# Patient Record
Sex: FEMALE | Race: WHITE | NOT HISPANIC OR LATINO | Employment: UNEMPLOYED | URBAN - METROPOLITAN AREA
[De-identification: names, ages, dates, MRNs, and addresses within clinical notes are randomized per-mention and may not be internally consistent; named-entity substitution may affect disease eponyms.]

---

## 2017-02-23 ENCOUNTER — HOSPITAL ENCOUNTER (EMERGENCY)
Facility: CLINIC | Age: 57
Discharge: HOME | End: 2017-02-23
Attending: EMERGENCY MEDICINE | Admitting: EMERGENCY MEDICINE
Payer: MEDICARE

## 2017-02-23 ENCOUNTER — APPOINTMENT (OUTPATIENT)
Dept: CT IMAGING | Facility: CLINIC | Age: 57
End: 2017-02-23
Attending: EMERGENCY MEDICINE
Payer: MEDICARE

## 2017-02-23 VITALS
SYSTOLIC BLOOD PRESSURE: 158 MMHG | WEIGHT: 151 LBS | DIASTOLIC BLOOD PRESSURE: 103 MMHG | TEMPERATURE: 98 F | HEART RATE: 99 BPM | BODY MASS INDEX: 23.7 KG/M2 | HEIGHT: 67 IN | RESPIRATION RATE: 16 BRPM | OXYGEN SATURATION: 99 %

## 2017-02-23 DIAGNOSIS — R10.32 ABDOMINAL PAIN, LEFT LOWER QUADRANT: ICD-10-CM

## 2017-02-23 DIAGNOSIS — K80.20 CALCULUS OF GALLBLADDER WITHOUT CHOLECYSTITIS WITHOUT OBSTRUCTION: ICD-10-CM

## 2017-02-23 DIAGNOSIS — R11.2 NAUSEA AND VOMITING, INTRACTABILITY OF VOMITING NOT SPECIFIED, UNSPECIFIED VOMITING TYPE: ICD-10-CM

## 2017-02-23 LAB
ALBUMIN SERPL-MCNC: 4.2 G/DL (ref 3.4–5)
ALBUMIN UR-MCNC: 10 MG/DL
ALP SERPL-CCNC: 93 U/L (ref 40–150)
ALT SERPL W P-5'-P-CCNC: 40 U/L (ref 0–50)
ANION GAP SERPL CALCULATED.3IONS-SCNC: 6 MMOL/L (ref 3–14)
APPEARANCE UR: ABNORMAL
AST SERPL W P-5'-P-CCNC: 31 U/L (ref 0–45)
BILIRUB SERPL-MCNC: 0.5 MG/DL (ref 0.2–1.3)
BILIRUB UR QL STRIP: NEGATIVE
BUN SERPL-MCNC: 13 MG/DL (ref 7–30)
CALCIUM SERPL-MCNC: 9 MG/DL (ref 8.5–10.1)
CHLORIDE SERPL-SCNC: 103 MMOL/L (ref 94–109)
CO2 SERPL-SCNC: 28 MMOL/L (ref 20–32)
COLOR UR AUTO: YELLOW
CREAT SERPL-MCNC: 0.68 MG/DL (ref 0.52–1.04)
ERYTHROCYTE [DISTWIDTH] IN BLOOD BY AUTOMATED COUNT: 12.9 % (ref 10–15)
GFR SERPL CREATININE-BSD FRML MDRD: 90 ML/MIN/1.7M2
GLUCOSE SERPL-MCNC: 123 MG/DL (ref 70–99)
GLUCOSE UR STRIP-MCNC: NEGATIVE MG/DL
HCT VFR BLD AUTO: 40.8 % (ref 35–47)
HGB BLD-MCNC: 13.9 G/DL (ref 11.7–15.7)
HGB UR QL STRIP: NEGATIVE
KETONES UR STRIP-MCNC: NEGATIVE MG/DL
LEUKOCYTE ESTERASE UR QL STRIP: NEGATIVE
LIPASE SERPL-CCNC: 147 U/L (ref 73–393)
MCH RBC QN AUTO: 30.6 PG (ref 26.5–33)
MCHC RBC AUTO-ENTMCNC: 34.1 G/DL (ref 31.5–36.5)
MCV RBC AUTO: 90 FL (ref 78–100)
NITRATE UR QL: NEGATIVE
PH UR STRIP: 7.5 PH (ref 5–7)
PLATELET # BLD AUTO: 254 10E9/L (ref 150–450)
POTASSIUM SERPL-SCNC: 3.8 MMOL/L (ref 3.4–5.3)
PROT SERPL-MCNC: 7.7 G/DL (ref 6.8–8.8)
RBC # BLD AUTO: 4.54 10E12/L (ref 3.8–5.2)
RBC #/AREA URNS AUTO: 0 /HPF (ref 0–2)
SODIUM SERPL-SCNC: 137 MMOL/L (ref 133–144)
SP GR UR STRIP: 1.02 (ref 1–1.03)
URN SPEC COLLECT METH UR: ABNORMAL
UROBILINOGEN UR STRIP-MCNC: NORMAL MG/DL (ref 0–2)
WBC # BLD AUTO: 11.7 10E9/L (ref 4–11)
WBC #/AREA URNS AUTO: 0 /HPF (ref 0–2)

## 2017-02-23 PROCEDURE — 25000125 ZZHC RX 250: Performed by: EMERGENCY MEDICINE

## 2017-02-23 PROCEDURE — 25500064 ZZH RX 255 OP 636: Performed by: EMERGENCY MEDICINE

## 2017-02-23 PROCEDURE — 96361 HYDRATE IV INFUSION ADD-ON: CPT

## 2017-02-23 PROCEDURE — 96374 THER/PROPH/DIAG INJ IV PUSH: CPT | Mod: 59

## 2017-02-23 PROCEDURE — 74177 CT ABD & PELVIS W/CONTRAST: CPT

## 2017-02-23 PROCEDURE — 99285 EMERGENCY DEPT VISIT HI MDM: CPT | Mod: 25

## 2017-02-23 PROCEDURE — 81001 URINALYSIS AUTO W/SCOPE: CPT | Performed by: EMERGENCY MEDICINE

## 2017-02-23 PROCEDURE — 25000128 H RX IP 250 OP 636: Performed by: EMERGENCY MEDICINE

## 2017-02-23 PROCEDURE — 83690 ASSAY OF LIPASE: CPT | Performed by: EMERGENCY MEDICINE

## 2017-02-23 PROCEDURE — 85027 COMPLETE CBC AUTOMATED: CPT | Performed by: EMERGENCY MEDICINE

## 2017-02-23 PROCEDURE — 80053 COMPREHEN METABOLIC PANEL: CPT | Performed by: EMERGENCY MEDICINE

## 2017-02-23 PROCEDURE — 96376 TX/PRO/DX INJ SAME DRUG ADON: CPT

## 2017-02-23 PROCEDURE — 96375 TX/PRO/DX INJ NEW DRUG ADDON: CPT

## 2017-02-23 RX ORDER — ONDANSETRON 4 MG/1
4 TABLET, ORALLY DISINTEGRATING ORAL EVERY 6 HOURS PRN
Qty: 15 TABLET | Refills: 0 | Status: SHIPPED | OUTPATIENT
Start: 2017-02-23

## 2017-02-23 RX ORDER — ONDANSETRON 2 MG/ML
4 INJECTION INTRAMUSCULAR; INTRAVENOUS
Status: COMPLETED | OUTPATIENT
Start: 2017-02-23 | End: 2017-02-23

## 2017-02-23 RX ORDER — ONDANSETRON 2 MG/ML
INJECTION INTRAMUSCULAR; INTRAVENOUS
Status: DISCONTINUED
Start: 2017-02-23 | End: 2017-02-23 | Stop reason: HOSPADM

## 2017-02-23 RX ORDER — LORAZEPAM 0.5 MG/1
TABLET ORAL
COMMUNITY
Start: 2009-12-28

## 2017-02-23 RX ORDER — RISPERIDONE 1 MG/1
.5-1 TABLET ORAL
COMMUNITY
Start: 2015-05-11

## 2017-02-23 RX ORDER — MORPHINE SULFATE 4 MG/ML
4 INJECTION, SOLUTION INTRAMUSCULAR; INTRAVENOUS
Status: COMPLETED | OUTPATIENT
Start: 2017-02-23 | End: 2017-02-23

## 2017-02-23 RX ORDER — ONDANSETRON 2 MG/ML
4 INJECTION INTRAMUSCULAR; INTRAVENOUS ONCE
Status: COMPLETED | OUTPATIENT
Start: 2017-02-23 | End: 2017-02-23

## 2017-02-23 RX ORDER — OXYCODONE AND ACETAMINOPHEN 5; 325 MG/1; MG/1
2 TABLET ORAL EVERY 6 HOURS PRN
Qty: 10 TABLET | Refills: 0 | Status: SHIPPED | OUTPATIENT
Start: 2017-02-23

## 2017-02-23 RX ORDER — IOPAMIDOL 755 MG/ML
74 INJECTION, SOLUTION INTRAVASCULAR ONCE
Status: COMPLETED | OUTPATIENT
Start: 2017-02-23 | End: 2017-02-23

## 2017-02-23 RX ORDER — KETOROLAC TROMETHAMINE 30 MG/ML
30 INJECTION, SOLUTION INTRAMUSCULAR; INTRAVENOUS ONCE
Status: COMPLETED | OUTPATIENT
Start: 2017-02-23 | End: 2017-02-23

## 2017-02-23 RX ORDER — ALBUTEROL SULFATE 90 UG/1
2 AEROSOL, METERED RESPIRATORY (INHALATION)
COMMUNITY
Start: 2016-12-01

## 2017-02-23 RX ADMIN — KETOROLAC TROMETHAMINE 30 MG: 30 INJECTION, SOLUTION INTRAMUSCULAR at 16:33

## 2017-02-23 RX ADMIN — ONDANSETRON 4 MG: 2 INJECTION INTRAMUSCULAR; INTRAVENOUS at 16:18

## 2017-02-23 RX ADMIN — MORPHINE SULFATE 2 MG: 4 INJECTION, SOLUTION INTRAMUSCULAR; INTRAVENOUS at 14:03

## 2017-02-23 RX ADMIN — PANTOPRAZOLE SODIUM 40 MG: 40 INJECTION, POWDER, FOR SOLUTION INTRAVENOUS at 16:38

## 2017-02-23 RX ADMIN — SODIUM CHLORIDE 1000 ML: 9 INJECTION, SOLUTION INTRAVENOUS at 14:07

## 2017-02-23 RX ADMIN — IOPAMIDOL 74 ML: 755 INJECTION, SOLUTION INTRAVENOUS at 14:44

## 2017-02-23 RX ADMIN — ONDANSETRON 4 MG: 2 INJECTION INTRAMUSCULAR; INTRAVENOUS at 13:27

## 2017-02-23 RX ADMIN — SODIUM CHLORIDE 61 ML: 9 INJECTION, SOLUTION INTRAVENOUS at 14:44

## 2017-02-23 ASSESSMENT — ENCOUNTER SYMPTOMS
DIARRHEA: 0
BLOOD IN STOOL: 0
FEVER: 0
NAUSEA: 1
ABDOMINAL PAIN: 1
VOMITING: 1

## 2017-02-23 NOTE — ED AVS SNAPSHOT
Emergency Department    [address] [place] [id number]    Phone:  [phone]    Fax:  [phone]                                       Marianna Fried   MRN: [id number]    Department:   Emergency Department   Date of Visit:  2/23/2017           After Visit Summary Signature Page     I have received my discharge instructions, and my questions have been answered. I have discussed any challenges I see with this plan with the nurse or doctor.    ..........................................................................................................................................  Patient/Patient Representative Signature      ..........................................................................................................................................  Patient Representative Print Name and Relationship to Patient    ..................................................               ................................................  Date                                            Time    ..........................................................................................................................................  Reviewed by Signature/Title    ...................................................              ..............................................  Date                                                            Time

## 2017-02-23 NOTE — ED PROVIDER NOTES
History     Chief Complaint:  Abdominal Pain    HPI:    Marianna Fried is a 56 year old female with a history of anxiety, depression, thyroid disease, and asthma who presents with left-sided abdominal pain. The patient reports that she has been experiencing left lower quadrant abdominal pain that started gradually earlier this morning with associated nausea. Although she endorses having this pain twice in the past, she states she has never had it with nausea. She has also never been formally evaluated for this type of pain in the past, as she says she has not seen a doctor for the past 8 years. After the initial onset, she induced vomiting by sticking her finger down her throat in hopes of alleviating her symptoms, but then later experienced four additional episodes of spontaneous vomiting throughout the day, prompting her visit to [facility]. There, she was evaluated and set up for outpatient CT imaging and blood work, but per her preference she instead came to the ED to expedite this process. She denies any fevers, blood in her stool, or diarrhea. No history of abdominal surgeries. BM this morning was somewhat larger than usual, but was otherwise unremarkable.    Allergies:  No known drug allergies      Medications:    Ativan  Risperidone  Albuterol inhaler     Past Medical History:    Anxiety  Depression  Thyroid Disease  Asthma    Past Surgical History:    History reviewed. No pertinent surgical history.     Family History:    History reviewed. No pertinent family history.      Social History:  Smoking status: Current Everyday Smoker  Alcohol use: No   Marital Status:        Review of Systems   Constitutional: Negative for fever.   Gastrointestinal: Positive for abdominal pain, nausea and vomiting. Negative for blood in stool and diarrhea.   All other systems reviewed and are negative.      Physical Exam     Patient Vitals for the past 24 hrs:   BP Temp Temp src Pulse Resp SpO2 Height Weight  "  02/23/17 1713 (!) 158/103 - - - - - - -   02/23/17 1308 141/87 98  F (36.7  C) Oral 99 16 99 % 1.702 m (5' 7\") 68.5 kg (151 lb)      Physical Exam:  General: nontoxic appearing woman sitting upright  HENT: mucous membranes moist   CV: regular rate, no murmur audible  Resp: clear throughout, normal effort, no crackles or wheezing  GI: abdomen soft, moderate tenderness LLQ > suprapubic, no guarding, bowel sounds normal, no palpable masses  MSK: no bony tenderness, no CVAT  Skin: appropriately warm and dry, no abdominal rash  Neuro: alert, clear speech, oriented   Psych: normal mood and affect    Emergency Department Course     Imaging:  Radiographic findings were communicated with the patient who voiced understanding of the findings.    CT-scan Abdomen/Pelvis w/ contrast:  IMPRESSION:  1. No evidence of bowel obstruction, diverticulitis or appendicitis.  2. Cholelithiasis.  3. Tiny probable liver cysts measuring less than 1 cm in the lateral  segment left hepatic lobe. No further follow-up suggested.  Preliminary result per radiology.      Laboratory:  Lipase: 147  CBC: WBC 11.7 (H), o/w WNL (HGB 13.9, )  CMP: Glucose 123 (H), o/w WNL (Creatinine 0.68)  UA Reflex: pH 7.5 (H), Protein Albumin 10, o/w Negative    Interventions:   1327- Zofran 4 mg IV  1407- NS 1L IV Bolus   1403- Morphine 4 mg IV  1444- Isovue-370 74 mL  1618- Zofran 4 mg IV  1633- Toradol 30 mg IV  1638- Protonix 40 mg IV    Emergency Department Course:  Past medical records, nursing notes, and vitals reviewed.  1415: I performed an exam of the patient and obtained history, as documented above.    IV inserted and blood drawn.    A urine sample was collected and tested.     The patient was sent for an abdominal and pelvic CT while in the emergency department, findings above.      The above interventions were administered.    1612: The nurse reports that the patient had another episode of vomiting. Another dose of Zofran IV was " administered.    1625: I rechecked the patient. Explained findings to the patient.    1708: I rechecked the patient. Laboratory and CT findings and plan explained to the Patient. Patient discharged home with instructions regarding supportive care, medications, and reasons to return. The importance of close follow-up was reviewed.       Impression & Plan      Medical Decision Making:  Marianna Fried is a 56 year old female who presents with primarily left lower quadrant discomfort. Consideration was given to diverticulitis, bowel obstruction, ovarian pathology, intraabdominal abscess, kidney stone, and many other potentially serious causes. Testing here shows no definitive cause. I discussed the CT finding of cholelithiasis with her, though I think that this is an incidental finding as it is not where she is having pain or tenderness. She improved with the above interventions and was comfortable with the plan for prescription medications to use as needed and close outpatient follow-up. She should return for any acute worsening of her condition.    Diagnosis:    ICD-10-CM   1. Abdominal pain, left lower quadrant R10.32   2. Calculus of gallbladder without cholecystitis without obstruction K80.20   3. Nausea and vomiting, intractability of vomiting not specified, unspecified vomiting type R11.2     Disposition:  discharged to home with Zofran and Percocet PRN.    Discharge Medications:   Details   ondansetron (ZOFRAN ODT) 4 MG ODT tab Take 1 tablet (4 mg) by mouth every 6 hours as needed for nausea, Disp-15 tablet, R-0, Local Print      oxyCODONE-acetaminophen (PERCOCET) 5-325 MG per tablet Take 2 tablets by mouth every 6 hours as needed for moderate to severe pain, Disp-10 tablet, R-0, Local Print     Carmen Roque  2/23/2017    EMERGENCY DEPARTMENT    I, Carmen Roque, am serving as a scribe at 2:15 PM on 2/23/2017 to document services personally performed by Pedro Barrow MD based on my observations and the  provider's statements to me.       Pedro Barrow MD  02/24/17 4138

## 2017-02-23 NOTE — ED AVS SNAPSHOT
Emergency Department    [address] [facility] [address]    Phone:  [phone]    Fax:  [phone]                                       Marianna Fried   MRN: [id number]    Department:   Emergency Department   Date of Visit:  2/23/2017           Patient Information     Date Of Birth          1960        Your diagnoses for this visit were:     Abdominal pain, left lower quadrant     Calculus of gallbladder without cholecystitis without obstruction     Nausea and vomiting, intractability of vomiting not specified, unspecified vomiting type        You were seen by Mirela Chakraborty MD and Pedro Barrow MD.      Follow-up Information     Follow up with Maranda Kapoor. Call in 1 day.    Specialty:  Nurse Practitioner - Gerontology    Contact information:    [facility]  [address]  [facility] [address]  [phone]          Follow up with  Emergency Department.    Specialty:  EMERGENCY MEDICINE    Why:  As needed, If symptoms worsen    Contact information:    [address] [facility] [address] [phone]        Discharge Instructions       Discharge Instructions  Abdominal Pain    Abdominal pain can be caused by many things. Your evaluation today does not show the exact cause for your pain. Your doctor today has decided that it is unlikely your pain is due to a life threatening problem, or a problem requiring surgery or hospital admission. Sometimes those problems cannot be found right away, so it is very important that you follow up as directed.  Sometimes only the changes which occur over time allow the cause of your pain to be found.    Return to the Emergency Department for a recheck in 8-12 hours if your pain continues.  If your pain gets worse, changes in location, or feels different, return to the Emergency Department right away.    ADULTS:  Return to the Emergency Department right away if:      You get an oral temperature above  102oF or as directed by your doctor.    You have blood in your stools (bright red or black, tarry stools).    You keep throwing up or can t drink liquids.    You see blood when you throw up.    You can t have a bowel movement or you can t pass gas.    Your stomach gets bloated or bigger.    Your skin or the whites of your eyes look yellow.    You faint.    You have bloody, frequent or painful urination.    You have new symptoms or anything that worries you.    CHILDREN:  Return to the Emergency Department right away if your child has any of the above-listed symptoms or the following:      Pushes your hand away or screams/cries when his/her belly is touched.    You notice your child is very fussy or weak.    Your child is very tired and is too tired to eat or drink.    Your child is dehydrated.  Signs of dehydration can be:  o Your infant has had no wet diapers in 4-5 hours.  o Your older child has not passed urine in 6-8 hours.  o Your infant or child starts to have dry mouth and lips, or no saliva or tears.    PREGNANT WOMEN:  Return to the Emergency Department right away if you have any of the above-listed symptoms or the following:      You have bleeding, leaking fluid or passing tissue from the vagina.    You have worse pain or cramping, or pain in your shoulder or back.    You have vomiting that will not stop.    You have painful or bloody urination.    You have a temperature of 100oF or more.    Your baby is not moving as much as usual.    You faint.    You get a bad headache with or without eye problems and abdominal pain.    You have a convulsion or seizure.    You have unusual discharge from your vagina and abdominal pain.    Abdominal pain is pretty common during pregnancy.  Your pain may or may not be related to your pregnancy. You should follow-up closely with your OB doctor so they can evaluate you and your baby.  Until you follow-up with your regular doctor, do the following:       Avoid sex and do not  "put anything in your vagina.    Drink clear fluids.    Only take medications approved by your doctor.    MORE INFORMATION:    Appendicitis:  A possible cause of abdominal pain in any person who still has their appendix is acute appendicitis. Appendicitis is often hard to diagnose.  Testing does not always rule out early appendicitis or other causes of abdominal pain. Close follow-up with your doctor and re-evaluations may be needed to figure out the reason for your abdominal pain.    Follow-up:  It is very important that you make an appointment with your clinic and go to the appointment.  If you do not follow-up with your primary doctor, it may result in missing an important development which could result in permanent injury or disability and/or lasting pain.  If there is any problem keeping your appointment, call your doctor or return to the Emergency Department.    Medications:  Take your medications as directed by your doctor today.  Before using over-the-counter medications, ask your doctor and make sure to take the medications as directed.  If you have any questions about medications, ask your doctor.    Diet:  Resume your normal diet as much as possible, but do not eat fried, fatty or spicy foods while you have pain.  Do not drink alcohol or have caffeine.  Do not smoke tobacco.    Probiotics: If you have been given an antibiotic, you may want to also take a probiotic pill or eat yogurt with live cultures. Probiotics have \"good bacteria\" to help your intestines stay healthy. Studies have shown that probiotics help prevent diarrhea and other intestine problems (including C. diff infection) when you take antibiotics. You can buy these without a prescription in the pharmacy section of the store.     If you were given a prescription for medicine here today, be sure to read all of the information (including the package insert) that comes with your prescription.  This will include important information about the " medicine, its side effects, and any warnings that you need to know about.  The pharmacist who fills the prescription can provide more information and answer questions you may have about the medicine.  If you have questions or concerns that the pharmacist cannot address, please call or return to the Emergency Department.         Opioid Medication Information    Pain medications are among the most commonly prescribed medicines, so we are including this information for all our patients. If you did not receive pain medication or get a prescription for pain medicine, you can ignore it.     You may have been given a prescription for an opioid (narcotic) pain medicine and/or have received a pain medicine while here in the Emergency Department. These medicines can make you drowsy or impaired. You must not drive, operate dangerous equipment, or engage in any other dangerous activities while taking these medications. If you drive while taking these medications, you could be arrested for DUI, or driving under the influence. Do not drink any alcohol while you are taking these medications.     Opioid pain medications can cause addiction. If you have a history of chemical dependency of any type, you are at a higher risk of becoming addicted to pain medications.  Only take these prescribed medications to treat your pain when all other options have been tried. Take it for as short a time and as few doses as possible. Store your pain pills in a secure place, as they are frequently stolen and provide a dangerous opportunity for children or visitors in your house to start abusing these powerful medications. We will not replace any lost or stolen medicine.  As soon as your pain is better, you should flush all your remaining medication.     Many prescription pain medications contain Tylenol  (acetaminophen), including Vicodin , Tylenol #3 , Norco , Lortab , and Percocet .  You should not take any extra pills of Tylenol  if you are using  these prescription medications or you can get very sick.  Do not ever take more than 3000 mg of acetaminophen in any 24 hour period.    All opioids tend to cause constipation. Drink plenty of water and eat foods that have a lot of fiber, such as fruits, vegetables, prune juice, apple juice and high fiber cereal.  Take a laxative if you don t move your bowels at least every other day. Miralax , Milk of Magnesia, Colace , or Senna  can be used to keep you regular.      Remember that you can always come back to the Emergency Department if you are not able to see your regular doctor in the amount of time listed above, if you get any new symptoms, or if there is anything that worries you.          24 Hour Appointment Hotline       To make an appointment at any [facility], call [phone] ([phone]). If you don't have a family doctor or clinic, we will help you find one. [facility] clinics are conveniently located to serve the needs of you and your family.             Review of your medicines      START taking        Dose / Directions Last dose taken    ondansetron 4 MG ODT tab   Commonly known as:  ZOFRAN ODT   Dose:  4 mg   Quantity:  15 tablet        Take 1 tablet (4 mg) by mouth every 6 hours as needed for nausea   Refills:  0        oxyCODONE-acetaminophen 5-325 MG per tablet   Commonly known as:  PERCOCET   Dose:  2 tablet   Quantity:  10 tablet        Take 2 tablets by mouth every 6 hours as needed for moderate to severe pain   Refills:  0          Our records show that you are taking the medicines listed below. If these are incorrect, please call your family doctor or clinic.        Dose / Directions Last dose taken    LORazepam 0.5 MG tablet   Commonly known as:  ATIVAN        Refills:  0        risperiDONE 1 MG tablet   Commonly known as:  risperDAL   Dose:  0.5-1 mg        Take 0.5-1 mg by mouth   Refills:  0        VENTOLIN  (90 BASE) MCG/ACT Inhaler   Dose:  2 puff   Generic drug:   albuterol        Inhale 2 puffs into the lungs   Refills:  0                Prescriptions were sent or printed at these locations (2 Prescriptions)                   Other Prescriptions                Printed at Department/Unit printer (2 of 2)         ondansetron (ZOFRAN ODT) 4 MG ODT tab               oxyCODONE-acetaminophen (PERCOCET) 5-325 MG per tablet                Procedures and tests performed during your visit     CBC (+ platelets, no diff)    CT Abdomen Pelvis w Contrast    Comprehensive metabolic panel    Lipase    UA with Microscopic reflex to Culture      Orders Needing Specimen Collection     None      Pending Results     No orders found from 2/21/2017 to 2/24/2017.            Pending Culture Results     No orders found from 2/21/2017 to 2/24/2017.             Test Results from your hospital stay     2/23/2017  1:56 PM - Interface, Flexilab Results      Component Results     Component Value Ref Range & Units Status    Sodium 137 133 - 144 mmol/L Final    Potassium 3.8 3.4 - 5.3 mmol/L Final    Chloride 103 94 - 109 mmol/L Final    Carbon Dioxide 28 20 - 32 mmol/L Final    Anion Gap 6 3 - 14 mmol/L Final    Glucose 123 (H) 70 - 99 mg/dL Final    Urea Nitrogen 13 7 - 30 mg/dL Final    Creatinine 0.68 0.52 - 1.04 mg/dL Final    GFR Estimate 90 >60 mL/min/1.7m2 Final    Non  GFR Calc    GFR Estimate If Black >90   GFR Calc   >60 mL/min/1.7m2 Final    Calcium 9.0 8.5 - 10.1 mg/dL Final    Bilirubin Total 0.5 0.2 - 1.3 mg/dL Final    Albumin 4.2 3.4 - 5.0 g/dL Final    Protein Total 7.7 6.8 - 8.8 g/dL Final    Alkaline Phosphatase 93 40 - 150 U/L Final    ALT 40 0 - 50 U/L Final    AST 31 0 - 45 U/L Final         2/23/2017  1:39 PM - Interface, Flexilab Results      Component Results     Component Value Ref Range & Units Status    WBC 11.7 (H) 4.0 - 11.0 10e9/L Final    RBC Count 4.54 3.8 - 5.2 10e12/L Final    Hemoglobin 13.9 11.7 - 15.7 g/dL Final    Hematocrit 40.8 35.0 -  47.0 % Final    MCV 90 78 - 100 fl Final    MCH 30.6 26.5 - 33.0 pg Final    MCHC 34.1 31.5 - 36.5 g/dL Final    RDW 12.9 10.0 - 15.0 % Final    Platelet Count 254 150 - 450 10e9/L Final         2/23/2017  1:54 PM - Interface, Flexilab Results      Component Results     Component Value Ref Range & Units Status    Lipase 147 73 - 393 U/L Final         2/23/2017  1:56 PM - Interface, Flexilab Results      Component Results     Component Value Ref Range & Units Status    Color Urine Yellow  Final    Appearance Urine Cloudy  Final    Glucose Urine Negative NEG mg/dL Final    Bilirubin Urine Negative NEG Final    Ketones Urine Negative NEG mg/dL Final    Specific Gravity Urine 1.017 1.003 - 1.035 Final    Blood Urine Negative NEG Final    pH Urine 7.5 (H) 5.0 - 7.0 pH Final    Protein Albumin Urine 10 (A) NEG mg/dL Final    Urobilinogen mg/dL Normal 0.0 - 2.0 mg/dL Final    Nitrite Urine Negative NEG Final    Leukocyte Esterase Urine Negative NEG Final    Source Midstream Urine  Final    WBC Urine 0 0 - 2 /HPF Final    RBC Urine 0 0 - 2 /HPF Final         2/23/2017  3:11 PM - Interface, Radiant Ib      Narrative     CT ABDOMEN AND PELVIS WITH CONTRAST 2/23/2017 2:45 PM     HISTORY: One day left lower quadrant pain.     TECHNIQUE: Axial images from the lung bases to the symphysis are  performed with additional coronal reformatted images. 74 mL of Isovue  370 are given intravenously.  Radiation dose for this scan was reduced  using automated exposure control, adjustment of the mA and/or kV  according to patient size, or iterative reconstruction technique.    FINDINGS: The lung bases are clear.    Abdomen: Two small subcentimeter cysts are noted in the lateral  segment left hepatic lobe. Calcified gallstone is present in the  gallbladder. No gallbladder distention or inflammation is appreciated.  The spleen, pancreas, adrenal glands and kidneys are unremarkable. No  hydronephrosis. No enlarged lymph nodes. The bowel is  normal in  caliber. No obstruction or diverticulitis. Appendix is normal.    Pelvis: The bladder, uterus, adnexal regions and rectum are  unremarkable. No enlarged pelvic lymph nodes or free fluid. Bone  window examination is within normal limits.        Impression     IMPRESSION:  1. No evidence of bowel obstruction, diverticulitis or appendicitis.  2. Cholelithiasis.  3. Tiny probable liver cysts measuring less than 1 cm in the lateral  segment left hepatic lobe. No further follow-up suggested.    KITTY ALVAREZ MD                Clinical Quality Measure: Blood Pressure Screening     Your blood pressure was checked while you were in the emergency department today. The last reading we obtained was  BP: 141/87 . Please read the guidelines below about what these numbers mean and what you should do about them.  If your systolic blood pressure (the top number) is less than 120 and your diastolic blood pressure (the bottom number) is less than 80, then your blood pressure is normal. There is nothing more that you need to do about it.  If your systolic blood pressure (the top number) is 120-139 or your diastolic blood pressure (the bottom number) is 80-89, your blood pressure may be higher than it should be. You should have your blood pressure rechecked within a year by a primary care provider.  If your systolic blood pressure (the top number) is 140 or greater or your diastolic blood pressure (the bottom number) is 90 or greater, you may have high blood pressure. High blood pressure is treatable, but if left untreated over time it can put you at risk for heart attack, stroke, or kidney failure. You should have your blood pressure rechecked by a primary care provider within the next 4 weeks.  If your provider in the emergency department today gave you specific instructions to follow-up with your doctor or provider even sooner than that, you should follow that instruction and not wait for up to 4 weeks for your follow-up  "visit.        Thank you for choosing [facility]       Thank you for choosing [facility] for your care. Our goal is always to provide you with excellent care. Hearing back from our patients is one way we can continue to improve our services. Please take a few minutes to complete the written survey that you may receive in the mail after you visit with us. Thank you!        [employer] Information     [employer] lets you send messages to your doctor, view your test results, renew your prescriptions, schedule appointments and more. To sign up, go to [email or web address] . Click on \"Log in\" on the left side of the screen, which will take you to the Welcome page. Then click on \"Sign up Now\" on the right side of the page.     You will be asked to enter the access code listed below, as well as some personal information. Please follow the directions to create your username and password.     Your access code is: [email or web address]  Expires: 2017  1:25 PM     Your access code will  in 90 days. If you need help or a new code, please call your [facility] clinic or [phone].        Care EveryWhere ID     This is your Care EveryWhere ID. This could be used by other organizations to access your [facility] medical records  [id number]        After Visit Summary       This is your record. Keep this with you and show to your community pharmacist(s) and doctor(s) at your next visit.                  "

## 2021-12-30 ENCOUNTER — APPOINTMENT (OUTPATIENT)
Dept: GENERAL RADIOLOGY | Facility: CLINIC | Age: 61
End: 2021-12-30
Attending: PHYSICIAN ASSISTANT
Payer: COMMERCIAL

## 2021-12-30 ENCOUNTER — HOSPITAL ENCOUNTER (EMERGENCY)
Facility: CLINIC | Age: 61
Discharge: HOME | End: 2021-12-30
Attending: PHYSICIAN ASSISTANT | Admitting: PHYSICIAN ASSISTANT
Payer: COMMERCIAL

## 2021-12-30 VITALS
SYSTOLIC BLOOD PRESSURE: 151 MMHG | TEMPERATURE: 99 F | OXYGEN SATURATION: 98 % | DIASTOLIC BLOOD PRESSURE: 88 MMHG | HEART RATE: 100 BPM | RESPIRATION RATE: 16 BRPM

## 2021-12-30 DIAGNOSIS — S91.351A DOG BITE OF RIGHT FOOT, INITIAL ENCOUNTER: ICD-10-CM
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
## 2021-12-30 PROCEDURE — 73630 X-RAY EXAM OF FOOT: CPT | Mod: RT

## 2021-12-30 PROCEDURE — 250N000013 HC RX MED GY IP 250 OP 250 PS 637: Performed by: PHYSICIAN ASSISTANT

## 2021-12-30 PROCEDURE — 99283 EMERGENCY DEPT VISIT LOW MDM: CPT

## 2021-12-30 RX ADMIN — AMOXICILLIN AND CLAVULANATE POTASSIUM 1 TABLET: 875; 125 TABLET, FILM COATED ORAL at 13:07

## 2021-12-30 NOTE — DISCHARGE INSTRUCTIONS
Continue Augmentin.   Clean the wound daily.   Watch for signs of infection - swelling, redness, warmth - and be re-seen if this occurs.   Otherwise, will provide information for [facility] as needed for follow up.

## 2021-12-30 NOTE — ED PROVIDER NOTES
History     Chief Complaint:  Dog Bite      [facility]   Marianna Fried is a 61 year old female who presents with a dog bite to her right foot. The patient has a pitbull/lab mix and she got bit in the middle of the night at approximately 0130. She has had pain on the dorsum of the right foot and has been unable to bear weight on the foot. There is a scant amount of erythema but she denies any fevers, chills, streaking, or purulent drainage. She declines a tetanus here but is insistent that her dog's rabies vaccinations are up-to-date.    Review of Systems  Ten review of systems negative, apart from what is noted above in HPI.     Allergies:  No Known Allergies      Medications:    albuterol (VENTOLIN HFA) 108 (90 BASE) MCG/ACT Inhaler  LORazepam (ATIVAN) 0.5 MG tablet  ondansetron (ZOFRAN ODT) 4 MG ODT tab  oxyCODONE-acetaminophen (PERCOCET) 5-325 MG per tablet  risperiDONE (RISPERDAL) 1 MG tablet        Past Medical History:    Past Medical History:   Diagnosis Date     Anxiety      Depressive disorder      Thyroid disease      Uncomplicated asthma      Past Surgical History:    No past surgical history on file.    Family History:    No family history on file.    Social History:  Presents alone  Daily smoker      Physical Exam     Patient Vitals for the past 24 hrs:   BP Temp Temp src Pulse Resp SpO2   12/30/21 1110 (!) 172/85 99  F (37.2  C) Temporal 100 16 98 %       Physical Exam  General: Alert and interactive. Appears well.   Head: Atraumatic, without obvious lesion, abrasion, hematoma.   Eyes: The pupils are equal and round. No scleral icterus.   ENT: No obvious abnormalities to the ears or nose. Mucous membranes moist.   Neck:Trachea is in the midline. No obvious swelling to the neck. Full range of motion.   CV: Regular rate. Extremities well perfused. Capillary refill brisk in toes.   Resp: Non-labored, no retractions or accessory muscle use.     GI: Abdomen is not distended.   MS: Moving all extremities well.  Tenderness diffusely across the top of the right foot, lateral greater than medial.   Skin: Puncture wound to the dorsum of the right foot. Minimal surrounding erythema.   Neuro: Alert and oriented x 3. Non-focal examination.    Psych: Awake. Alert.  Normal affect. Appropriate interactions.    Emergency Department Course     Imaging:  Foot  XR, G/E 3 views, right   Final Result   IMPRESSION: Negative exam. Possible dorsal subcutaneous soft tissue   gas. No apparent radiopaque foreign body.      REECE NEVES MD            SYSTEM ID:  [id number]          Laboratory:  Labs Ordered and Resulted from Time of ED Arrival to Time of ED Departure - No data to display    Emergency Department Course:    Reviewed:  I reviewed nursing notes, vitals, past history and care everywhere    Assessments:   I obtained history and examined the patient as noted above.    I rechecked the patient and explained findings.       Interventions:  Medications   amoxicillin-clavulanate (AUGMENTIN) 875-125 MG per tablet 1 tablet (1 tablet Oral Given 12/30/21 1307)       Disposition:  The patient was discharged to home.    Impression & Plan      Medical Decision Making:  Marianna Fried is a 61 year old female who presents for evaluation after sustaining a dog bite to her right foot.  She does have some bruising to the dorsum of the right foot. She is sure that her dog's rabies vaccines are up-to-date. She refused a tetanus today. X-ray shows no significant gas in the wound or broken bones. We discussed the risk of infection as well as close return precautions for purulent drainage, significant erythema, or streaking up the foot. No signs of abscess or acute infection today.  Was initiated on Augmentin for prophylaxis. Information for orthopedics was provided for follow-up as indicated.      Covid-19  Marianna Fried was evaluated during a global COVID-19 pandemic, which necessitated consideration that the patient might be at risk for infection with  the SARS-CoV-2 virus that causes COVID-19.   Applicable protocols for evaluation were followed during the patient's care.   COVID-19 was considered as part of the patient's evaluation. The plan for testing is:  a test was obtained during this visit.    Diagnosis:    ICD-10-CM    1. Dog bite of right foot, initial encounter  S91.351A     W54.0XXA           Chloé Crum PA-C  12/30/21 2129